# Patient Record
Sex: MALE | Race: WHITE | NOT HISPANIC OR LATINO | URBAN - METROPOLITAN AREA
[De-identification: names, ages, dates, MRNs, and addresses within clinical notes are randomized per-mention and may not be internally consistent; named-entity substitution may affect disease eponyms.]

---

## 2017-10-24 ENCOUNTER — TELEPHONE (OUTPATIENT)
Dept: OTHER | Facility: CLINIC | Age: 27
End: 2017-10-24

## 2018-03-01 ENCOUNTER — OFFICE VISIT - HEALTHEAST (OUTPATIENT)
Dept: INTERNAL MEDICINE | Facility: CLINIC | Age: 28
End: 2018-03-01

## 2018-03-01 DIAGNOSIS — H10.9 CONJUNCTIVITIS: ICD-10-CM

## 2018-08-15 ENCOUNTER — COMMUNICATION - HEALTHEAST (OUTPATIENT)
Dept: INTERNAL MEDICINE | Facility: CLINIC | Age: 28
End: 2018-08-15

## 2018-08-15 ENCOUNTER — OFFICE VISIT - HEALTHEAST (OUTPATIENT)
Dept: INTERNAL MEDICINE | Facility: CLINIC | Age: 28
End: 2018-08-15

## 2018-08-15 DIAGNOSIS — J32.9 SINUSITIS: ICD-10-CM

## 2018-08-15 DIAGNOSIS — Z23 ENCOUNTER FOR IMMUNIZATION: ICD-10-CM

## 2018-08-15 ASSESSMENT — MIFFLIN-ST. JEOR: SCORE: 1657.06

## 2021-06-01 VITALS — BODY MASS INDEX: 23.4 KG/M2 | HEIGHT: 69 IN | WEIGHT: 158 LBS

## 2021-06-01 VITALS — WEIGHT: 151 LBS

## 2021-06-16 NOTE — PROGRESS NOTES
ASSESSMENT AND PLAN:      1. Conjunctivitis  Bilateral resolving conjunctivitis, likely viral.  Mattering is now resolved.  No indication for treatment.  Reassured.  Follow up PRN.        CHIEF COMPLAINT:  Chief Complaint   Patient presents with     Conjunctivitis     X a week of possible pink eye, exposed to pink eye from neice       HISTORY OF PRESENT ILLNESS:  Tomas Whittaker is a 27 y.o. [place] [facility] [place] [facility]  He developed congestion and mattering with itching in both eyes.  This persisted for a number of days, and now is resolving.  No mattering now, minor itching.  He never had visual disturbance.  Otherwise feels well, no sore throat or cough.      VITALS:  Vitals:    03/01/18 1020   BP: 100/60   Patient Site: Left Arm   Patient Position: Sitting   Cuff Size: Adult Regular   Pulse: 70   SpO2: 98%   Weight: 151 lb (68.5 kg)     Wt Readings from Last 3 Encounters:   03/01/18 151 lb (68.5 kg)     PHYSICAL EXAM:  Constitutional:  Well appearing in NAD, alert and oriented  Ears:  Clear.  Eyes: - mild bilateral conjunctivity with scleral injection, no lid erythema or mattering.     MEDICATIONS:  No current outpatient prescriptions on file.     No current facility-administered medications for this visit.        Kayode Villegas MD  Internal Medicine  [facility]

## 2021-06-19 NOTE — PROGRESS NOTES
ASSESSMENT AND PLAN:    1. Sinusitis  Exam and history are consistent with sinusitis.  Will treat with azithromycin 500mg po today, then 250 mg po daily for 4 days.  Follow up if fails to improve.     2. Encounter for immunization  Tdap today.     CHIEF COMPLAINT:  Chief Complaint   Patient presents with     Sinus Problem     X 5 days of headche, congestion, and running nose, and stuffing on evening      Immunizations     Want to get tdap     HISTORY OF PRESENT ILLNESS:  Tomas Whittaker is a 27 y.o. male with recent URI, now persistent nasal congestion, mostly non-productive cough, nasal drainage and some facial pain.  No discrete teeth pain. Feels warm, no chills or sweats.  No significant headache or any nausea.  Did have mild diarrhea that is improving.     Active Ambulatory Problems     Diagnosis Date Noted     No Active Ambulatory Problems     Resolved Ambulatory Problems     Diagnosis Date Noted     No Resolved Ambulatory Problems     No Additional Past Medical History     VITALS:  Vitals:    08/15/18 1137   BP: 122/72   Patient Site: Left Arm   Patient Position: Sitting   Cuff Size: Adult Regular   Pulse: 68   Temp: 97.8  F (36.6  C)   TempSrc: Tympanic   SpO2: 99%   Weight: 158 lb (71.7 kg)     Wt Readings from Last 3 Encounters:   08/15/18 158 lb (71.7 kg)   03/01/18 151 lb (68.5 kg)     PHYSICAL EXAM:  Constitutional:  Well appearing in NAD, alert and oriented  Ears:  Clear.  Oropharynx:  mild erythema  Nose: bilateral mucopurulent drainage  Neck:  Supple, no significant adenopathy.  Cardiac:  S1 S2 without murmur, rhythm regular  Lungs: Clear to auscultation, without wheezes or rales    Current Outpatient Prescriptions   Medication Sig Dispense Refill     azithromycin (ZITHROMAX Z-BELLO) 250 MG tablet Take 2 tablets (500 mg) on  Day 1,  followed by 1 tablet (250 mg) once daily on Days 2 through 5. 6 tablet 0     No current facility-administered medications for this visit.      Kayode Villegas,  MD  Internal Medicine  [facility]

## 2021-08-14 ENCOUNTER — HEALTH MAINTENANCE LETTER (OUTPATIENT)
Age: 31
End: 2021-08-14

## 2021-10-09 ENCOUNTER — HEALTH MAINTENANCE LETTER (OUTPATIENT)
Age: 31
End: 2021-10-09

## 2022-09-17 ENCOUNTER — HEALTH MAINTENANCE LETTER (OUTPATIENT)
Age: 32
End: 2022-09-17

## 2023-10-07 ENCOUNTER — HEALTH MAINTENANCE LETTER (OUTPATIENT)
Age: 33
End: 2023-10-07